# Patient Record
(demographics unavailable — no encounter records)

---

## 2024-11-01 NOTE — PHYSICAL EXAM
[Normal Appearance] : normal appearance [General Appearance - In No Acute Distress] : no acute distress [Heart Rate And Rhythm] : heart rate and rhythm were normal [] : no respiratory distress [Abdomen Soft] : soft [Urethral Meatus] : meatus normal [Penis Abnormality] : normal uncircumcised penis [Epididymis] : the epididymides were normal [Testes Tenderness] : no tenderness of the testes [Testes Mass (___cm)] : there were no testicular masses [Normal Station and Gait] : the gait and station were normal for the patient's age [Skin Color & Pigmentation] : normal skin color and pigmentation [No Focal Deficits] : no focal deficits [Oriented To Time, Place, And Person] : oriented to person, place, and time [Not Anxious] : not anxious

## 2024-11-01 NOTE — ASSESSMENT
[FreeTextEntry1] : 63 yo male with elevated PSA.    The patient's records were reviewed and discussed. The differential diagnosis of an elevated PSA (prostate specific antigen) level was discussed including prostate enlargement, prostate inflammation or infection, and prostate cancer. Options were provided for further evaluation including, but not limited to, serial PSA and digital rectal exam, PCA3, prostate MRI, and prostate biopsy. Newer tests including PHI (prostate health index) and 4Kscore tests were discussed. The merits and limitations as well as adverse effects associated with each option was provided.   The patient asked questions and they were answered. The patient elects for repeat PSA today.  If elevated will proceed with MRI.   Plan: 1. PSA today 2. F/u after # 1

## 2024-11-01 NOTE — HISTORY OF PRESENT ILLNESS
[FreeTextEntry1] : Mr. JENNY QUARLES comes in today for his annual urologic follow-up. He reports moderate lower urinary tract symptoms with occasional nocturia x 1. He has intermittent chronic dysuria (longstanding, variable, localized). Most recently in December, he reports increased incomplete emptying after taking antihistamines for a cold. He has not had recurrent episodes since that time.  IPSS: 10/35 Sono (performed to assess bladder emptying): PVR 2 cc, 46 cc prostate  His erections are satisfactory.  PSAs: 5/15/21--4.2; 10/9/19--4.19; 3/31/13--4.6;   ************** 11/1/24: Patient returns for follow up for hx of elevated PSA.  He last had a PSA in September.  He isn't sure what the level was but thinks it was around 4.6.  His last available PSA was 4.94 from 3/17/23.  He denies any LUTS, hematuria, dysuria, or perineal pain.  He denies a family hx of  malignancies.